# Patient Record
Sex: MALE | Race: AMERICAN INDIAN OR ALASKA NATIVE | NOT HISPANIC OR LATINO | URBAN - METROPOLITAN AREA
[De-identification: names, ages, dates, MRNs, and addresses within clinical notes are randomized per-mention and may not be internally consistent; named-entity substitution may affect disease eponyms.]

---

## 2017-01-05 ENCOUNTER — AMBULATORY - HEALTHEAST (OUTPATIENT)
Dept: NURSING | Facility: CLINIC | Age: 27
End: 2017-01-05

## 2017-01-05 DIAGNOSIS — Z23 NEED FOR IMMUNIZATION AGAINST INFLUENZA: ICD-10-CM

## 2017-02-06 ENCOUNTER — OFFICE VISIT - HEALTHEAST (OUTPATIENT)
Dept: FAMILY MEDICINE | Facility: CLINIC | Age: 27
End: 2017-02-06

## 2017-02-06 DIAGNOSIS — L81.0 POSTINFLAMMATORY HYPERPIGMENTATION: ICD-10-CM

## 2017-02-06 DIAGNOSIS — E66.01 OBESITY, MORBID, BMI 50 OR HIGHER (H): ICD-10-CM

## 2017-02-06 DIAGNOSIS — L03.90 CELLULITIS: ICD-10-CM

## 2017-02-06 DIAGNOSIS — Z00.00 ROUTINE GENERAL MEDICAL EXAMINATION AT A HEALTH CARE FACILITY: ICD-10-CM

## 2017-02-06 ASSESSMENT — MIFFLIN-ST. JEOR: SCORE: 2716.24

## 2017-02-07 ENCOUNTER — COMMUNICATION - HEALTHEAST (OUTPATIENT)
Dept: TELEHEALTH | Facility: CLINIC | Age: 27
End: 2017-02-07

## 2017-02-07 ENCOUNTER — AMBULATORY - HEALTHEAST (OUTPATIENT)
Dept: LAB | Facility: CLINIC | Age: 27
End: 2017-02-07

## 2017-02-07 DIAGNOSIS — R73.09 ELEVATED GLUCOSE: ICD-10-CM

## 2017-02-07 DIAGNOSIS — Z00.00 ROUTINE GENERAL MEDICAL EXAMINATION AT A HEALTH CARE FACILITY: ICD-10-CM

## 2017-02-07 LAB
CHOLEST SERPL-MCNC: 184 MG/DL
FASTING STATUS PATIENT QL REPORTED: YES
HBA1C MFR BLD: 6.7 % (ref 3.5–6.1)
HDLC SERPL-MCNC: 47 MG/DL
LDLC SERPL CALC-MCNC: 98 MG/DL
TRIGL SERPL-MCNC: 193 MG/DL

## 2017-02-09 ENCOUNTER — COMMUNICATION - HEALTHEAST (OUTPATIENT)
Dept: FAMILY MEDICINE | Facility: CLINIC | Age: 27
End: 2017-02-09

## 2017-02-13 ENCOUNTER — OFFICE VISIT - HEALTHEAST (OUTPATIENT)
Dept: FAMILY MEDICINE | Facility: CLINIC | Age: 27
End: 2017-02-13

## 2017-02-13 DIAGNOSIS — E11.9 DIABETES MELLITUS (H): ICD-10-CM

## 2017-02-16 ENCOUNTER — AMBULATORY - HEALTHEAST (OUTPATIENT)
Dept: FAMILY MEDICINE | Facility: CLINIC | Age: 27
End: 2017-02-16

## 2017-02-16 ENCOUNTER — COMMUNICATION - HEALTHEAST (OUTPATIENT)
Dept: FAMILY MEDICINE | Facility: CLINIC | Age: 27
End: 2017-02-16

## 2017-03-21 ENCOUNTER — RECORDS - HEALTHEAST (OUTPATIENT)
Dept: ADMINISTRATIVE | Facility: OTHER | Age: 27
End: 2017-03-21

## 2017-05-17 ENCOUNTER — COMMUNICATION - HEALTHEAST (OUTPATIENT)
Dept: FAMILY MEDICINE | Facility: CLINIC | Age: 27
End: 2017-05-17

## 2017-05-17 DIAGNOSIS — I10 HYPERTENSION: ICD-10-CM

## 2017-10-28 ENCOUNTER — OFFICE VISIT - HEALTHEAST (OUTPATIENT)
Dept: FAMILY MEDICINE | Facility: CLINIC | Age: 27
End: 2017-10-28

## 2017-10-28 DIAGNOSIS — K62.5 RECTAL BLEEDING: ICD-10-CM

## 2017-10-28 DIAGNOSIS — Z23 NEED FOR INFLUENZA VACCINATION: ICD-10-CM

## 2018-03-01 ENCOUNTER — COMMUNICATION - HEALTHEAST (OUTPATIENT)
Dept: FAMILY MEDICINE | Facility: CLINIC | Age: 28
End: 2018-03-01

## 2018-03-01 DIAGNOSIS — I10 HYPERTENSION: ICD-10-CM

## 2018-04-17 ENCOUNTER — COMMUNICATION - HEALTHEAST (OUTPATIENT)
Dept: TELEHEALTH | Facility: CLINIC | Age: 28
End: 2018-04-17

## 2018-04-17 ENCOUNTER — OFFICE VISIT - HEALTHEAST (OUTPATIENT)
Dept: FAMILY MEDICINE | Facility: CLINIC | Age: 28
End: 2018-04-17

## 2018-04-17 ENCOUNTER — COMMUNICATION - HEALTHEAST (OUTPATIENT)
Dept: SCHEDULING | Facility: CLINIC | Age: 28
End: 2018-04-17

## 2018-04-17 DIAGNOSIS — E78.5 HYPERLIPIDEMIA LDL GOAL <70: ICD-10-CM

## 2018-04-17 DIAGNOSIS — E66.01 OBESITY, MORBID, BMI 50 OR HIGHER (H): ICD-10-CM

## 2018-04-17 DIAGNOSIS — E11.9 DIABETES MELLITUS (H): ICD-10-CM

## 2018-04-17 DIAGNOSIS — I10 ESSENTIAL HYPERTENSION: ICD-10-CM

## 2018-04-17 LAB
ANION GAP SERPL CALCULATED.3IONS-SCNC: 20 MMOL/L (ref 5–18)
BUN SERPL-MCNC: 13 MG/DL (ref 8–22)
CALCIUM SERPL-MCNC: 9.9 MG/DL (ref 8.5–10.5)
CHLORIDE BLD-SCNC: 96 MMOL/L (ref 98–107)
CHOLEST SERPL-MCNC: 251 MG/DL
CO2 SERPL-SCNC: 22 MMOL/L (ref 22–31)
CREAT SERPL-MCNC: 1.15 MG/DL (ref 0.7–1.3)
CREAT UR-MCNC: 87.3 MG/DL
FASTING STATUS PATIENT QL REPORTED: YES
GFR SERPL CREATININE-BSD FRML MDRD: >60 ML/MIN/1.73M2
GLUCOSE BLD-MCNC: 471 MG/DL (ref 70–125)
HBA1C MFR BLD: 12.9 % (ref 3.5–6.1)
HDLC SERPL-MCNC: 40 MG/DL
LDLC SERPL CALC-MCNC: 143 MG/DL
MICROALBUMIN UR-MCNC: 43.19 MG/DL (ref 0–1.99)
MICROALBUMIN/CREAT UR: 494.7 MG/G
POTASSIUM BLD-SCNC: 4.5 MMOL/L (ref 3.5–5)
SODIUM SERPL-SCNC: 138 MMOL/L (ref 136–145)
TRIGL SERPL-MCNC: 342 MG/DL

## 2018-04-17 RX ORDER — LISINOPRIL 10 MG/1
10 TABLET ORAL DAILY
Qty: 90 TABLET | Refills: 1 | Status: SHIPPED | OUTPATIENT
Start: 2018-04-17

## 2018-04-17 RX ORDER — ASPIRIN 81 MG/1
81 TABLET, CHEWABLE ORAL DAILY
Qty: 30 TABLET | Refills: 11 | Status: SHIPPED | OUTPATIENT
Start: 2018-04-17

## 2018-04-17 ASSESSMENT — MIFFLIN-ST. JEOR: SCORE: 2545.01

## 2018-04-18 RX ORDER — ATORVASTATIN CALCIUM 80 MG/1
80 TABLET, FILM COATED ORAL AT BEDTIME
Qty: 90 TABLET | Refills: 3 | Status: SHIPPED | OUTPATIENT
Start: 2018-04-18 | End: 2019-04-18

## 2018-04-30 ENCOUNTER — AMBULATORY - HEALTHEAST (OUTPATIENT)
Dept: EDUCATION SERVICES | Facility: CLINIC | Age: 28
End: 2018-04-30

## 2018-05-01 ENCOUNTER — AMBULATORY - HEALTHEAST (OUTPATIENT)
Dept: EDUCATION SERVICES | Facility: CLINIC | Age: 28
End: 2018-05-01

## 2018-05-01 RX ORDER — GLUCOSAMINE HCL/CHONDROITIN SU 500-400 MG
1 CAPSULE ORAL 2 TIMES DAILY
Qty: 100 STRIP | Refills: 3 | Status: SHIPPED | OUTPATIENT
Start: 2018-05-01

## 2018-05-05 ENCOUNTER — COMMUNICATION - HEALTHEAST (OUTPATIENT)
Dept: FAMILY MEDICINE | Facility: CLINIC | Age: 28
End: 2018-05-05

## 2018-05-05 DIAGNOSIS — K64.8 INTERNAL HEMORRHOID: ICD-10-CM

## 2018-05-07 ENCOUNTER — AMBULATORY - HEALTHEAST (OUTPATIENT)
Dept: EDUCATION SERVICES | Facility: CLINIC | Age: 28
End: 2018-05-07

## 2018-05-07 DIAGNOSIS — E11.9 DIABETES (H): ICD-10-CM

## 2018-05-08 ENCOUNTER — AMBULATORY - HEALTHEAST (OUTPATIENT)
Dept: EDUCATION SERVICES | Facility: CLINIC | Age: 28
End: 2018-05-08

## 2018-05-08 ENCOUNTER — COMMUNICATION - HEALTHEAST (OUTPATIENT)
Dept: EDUCATION SERVICES | Facility: CLINIC | Age: 28
End: 2018-05-08

## 2018-05-11 ENCOUNTER — OFFICE VISIT - HEALTHEAST (OUTPATIENT)
Dept: SURGERY | Facility: CLINIC | Age: 28
End: 2018-05-11

## 2018-05-11 DIAGNOSIS — K64.1 GRADE II HEMORRHOIDS: ICD-10-CM

## 2018-05-11 ASSESSMENT — MIFFLIN-ST. JEOR: SCORE: 2532.31

## 2018-05-18 ENCOUNTER — OFFICE VISIT - HEALTHEAST (OUTPATIENT)
Dept: SURGERY | Facility: CLINIC | Age: 28
End: 2018-05-18

## 2018-05-18 DIAGNOSIS — E66.01 OBESITY, MORBID, BMI 50 OR HIGHER (H): ICD-10-CM

## 2018-05-18 DIAGNOSIS — K64.8 INTERNAL BLEEDING HEMORRHOIDS: ICD-10-CM

## 2018-05-18 ASSESSMENT — MIFFLIN-ST. JEOR: SCORE: 2530.49

## 2018-05-21 ENCOUNTER — OFFICE VISIT - HEALTHEAST (OUTPATIENT)
Dept: FAMILY MEDICINE | Facility: CLINIC | Age: 28
End: 2018-05-21

## 2018-05-21 DIAGNOSIS — E11.9 DIABETES MELLITUS (H): ICD-10-CM

## 2018-05-21 DIAGNOSIS — I10 ESSENTIAL HYPERTENSION: ICD-10-CM

## 2018-05-21 DIAGNOSIS — E78.5 HYPERLIPIDEMIA LDL GOAL <70: ICD-10-CM

## 2018-05-21 DIAGNOSIS — E66.01 OBESITY, MORBID, BMI 50 OR HIGHER (H): ICD-10-CM

## 2018-05-21 LAB
ANION GAP SERPL CALCULATED.3IONS-SCNC: 11 MMOL/L (ref 5–18)
BUN SERPL-MCNC: 10 MG/DL (ref 8–22)
CALCIUM SERPL-MCNC: 9.3 MG/DL (ref 8.5–10.5)
CHLORIDE BLD-SCNC: 102 MMOL/L (ref 98–107)
CO2 SERPL-SCNC: 23 MMOL/L (ref 22–31)
CREAT SERPL-MCNC: 0.83 MG/DL (ref 0.7–1.3)
GFR SERPL CREATININE-BSD FRML MDRD: >60 ML/MIN/1.73M2
GLUCOSE BLD-MCNC: 331 MG/DL (ref 70–125)
HBA1C MFR BLD: 13.1 % (ref 3.5–6.1)
POTASSIUM BLD-SCNC: 4.4 MMOL/L (ref 3.5–5)
SODIUM SERPL-SCNC: 136 MMOL/L (ref 136–145)

## 2018-05-22 ENCOUNTER — AMBULATORY - HEALTHEAST (OUTPATIENT)
Dept: EDUCATION SERVICES | Facility: CLINIC | Age: 28
End: 2018-05-22

## 2018-05-23 ENCOUNTER — COMMUNICATION - HEALTHEAST (OUTPATIENT)
Dept: FAMILY MEDICINE | Facility: CLINIC | Age: 28
End: 2018-05-23

## 2018-06-08 ENCOUNTER — OFFICE VISIT - HEALTHEAST (OUTPATIENT)
Dept: SURGERY | Facility: CLINIC | Age: 28
End: 2018-06-08

## 2018-06-08 DIAGNOSIS — K64.8 INTERNAL HEMORRHOIDS: ICD-10-CM

## 2018-06-12 ENCOUNTER — COMMUNICATION - HEALTHEAST (OUTPATIENT)
Dept: EDUCATION SERVICES | Facility: CLINIC | Age: 28
End: 2018-06-12

## 2018-06-19 ENCOUNTER — OFFICE VISIT - HEALTHEAST (OUTPATIENT)
Dept: FAMILY MEDICINE | Facility: CLINIC | Age: 28
End: 2018-06-19

## 2018-06-19 ENCOUNTER — AMBULATORY - HEALTHEAST (OUTPATIENT)
Dept: EDUCATION SERVICES | Facility: CLINIC | Age: 28
End: 2018-06-19

## 2018-06-19 DIAGNOSIS — E66.01 OBESITY, MORBID, BMI 50 OR HIGHER (H): ICD-10-CM

## 2018-06-19 DIAGNOSIS — E11.9 DIABETES MELLITUS (H): ICD-10-CM

## 2018-06-19 DIAGNOSIS — I10 ESSENTIAL HYPERTENSION: ICD-10-CM

## 2018-06-19 DIAGNOSIS — Z23 NEED FOR TDAP VACCINATION: ICD-10-CM

## 2018-06-19 DIAGNOSIS — E78.5 HYPERLIPIDEMIA LDL GOAL <70: ICD-10-CM

## 2018-06-19 LAB — HBA1C MFR BLD: 10.5 % (ref 3.5–6.1)

## 2018-06-22 ENCOUNTER — OFFICE VISIT - HEALTHEAST (OUTPATIENT)
Dept: SURGERY | Facility: CLINIC | Age: 28
End: 2018-06-22

## 2018-06-22 DIAGNOSIS — K64.8 INTERNAL HEMORRHOIDS: ICD-10-CM

## 2018-06-22 DIAGNOSIS — E66.01 MORBID OBESITY (H): ICD-10-CM

## 2020-05-29 ENCOUNTER — COMMUNICATION - HEALTHEAST (OUTPATIENT)
Dept: FAMILY MEDICINE | Facility: CLINIC | Age: 30
End: 2020-05-29

## 2020-06-02 ENCOUNTER — COMMUNICATION - HEALTHEAST (OUTPATIENT)
Dept: FAMILY MEDICINE | Facility: CLINIC | Age: 30
End: 2020-06-02

## 2021-05-30 VITALS — BODY MASS INDEX: 55.07 KG/M2 | WEIGHT: 315 LBS

## 2021-05-30 VITALS — BODY MASS INDEX: 45.1 KG/M2 | HEIGHT: 70 IN | WEIGHT: 315 LBS

## 2021-05-31 VITALS — WEIGHT: 315 LBS | BODY MASS INDEX: 55.04 KG/M2

## 2021-06-01 VITALS — WEIGHT: 315 LBS | BODY MASS INDEX: 48.69 KG/M2

## 2021-06-01 VITALS — WEIGHT: 315 LBS | BODY MASS INDEX: 45.1 KG/M2 | HEIGHT: 70 IN

## 2021-06-01 VITALS — BODY MASS INDEX: 48.11 KG/M2 | WEIGHT: 315 LBS

## 2021-06-01 VITALS — BODY MASS INDEX: 44.1 KG/M2 | WEIGHT: 315 LBS | HEIGHT: 71 IN

## 2021-06-01 VITALS — BODY MASS INDEX: 48.32 KG/M2 | WEIGHT: 315 LBS

## 2021-06-01 VITALS — HEIGHT: 71 IN | WEIGHT: 315 LBS | BODY MASS INDEX: 44.1 KG/M2

## 2021-06-01 VITALS — BODY MASS INDEX: 49.41 KG/M2 | WEIGHT: 315 LBS

## 2021-06-08 NOTE — PROGRESS NOTES
ASSESSMENT & PLAN:  1. Diabetes mellitus  - Glutamic Acid Decarboxylase  - C-Peptide  - Basic Metabolic Panel; Future  - Microalbumin, Random Urine  - Ambulatory referral to Ophthalmology  - Ambulatory referral to Diabetes Education (New Diagnosis)  - Basic Metabolic Panel     Informed of test results.  Newly diagnosed diabetes.  Fairly young at time of diagnosis.  We'll rule out type 1 diabetes.  Discussed condition in general as well as complications associated with it.  Advised dietary modification, continue to increase physical activity as tolerated.  Await results prior to further recommendations.  We also discussed ACE inhibitor if urine microalbumin comes back positive.  Yearly eye exam and we'll refer him to [facility].  Check his feet regularly and avoid walking barefoot.  Follow up in 3 months.  Also refer him to diabetes educators.  Cellulitis improved.  To finish the full course of antibiotic treatment.  He was agreeable with the plans.    Orders Placed This Encounter   Procedures     Glutamic Acid Decarboxylase     C-Peptide     Basic Metabolic Panel     Standing Status:   Future     Number of Occurrences:   1     Standing Expiration Date:   2/13/2018     Microalbumin, Random Urine     Ambulatory referral to Ophthalmology     Referral Priority:   Routine     Referral Type:   Consultation     Referral Reason:   Evaluation and Treatment     Referral Location:   [facility]     Requested Specialty:   Ophthalmology     Number of Visits Requested:   1     Ambulatory referral to Diabetes Education (New Diagnosis)     Referral Priority:   Routine     Referral Type:   Consultation     Referral Reason:   Evaluation and Treatment     Number of Visits Requested:   1      CHIEF COMPLAINT:  Chief Complaint   Patient presents with     Lab Result Follow Up        HISTORY OF PRESENT ILLNESS:  Joselo is a 26 y.o. male presenting to the clinic today to discuss his newly diagnosed diabetes. His A1c was 6.7  and his fasting glucose was 164 on 2/7/2017. His total cholesterol was 184, triglycerides were 193, HDL was 47, and LDL was 98 on 2/7/2017. He does consume a lot of flavored beverages; he has soda at restaurants about 3 times per week, but he is trying to just drink water from now on. He does not eat many sweets or treats, but he does eat many carbohydrates like pasta on a daily basis. He went to the gym 5 times last week. He typically alternates days between treadmill, elliptical and stairs for cardio exercise. He alternates between arm and leg weight exercises on different days; he does 3 sets of 12 repetitions with weights. He is wondering if he needs to start diabetes medication at this time, and he is wondering if he has Type 1 or Type 2. He would like an opthalmology referral.     REVIEW OF SYSTEMS:   Cellulitis: He is taking Keflex 500 mg as prescribed. He denies any pain, redness, or warmth of the area. He and his parents both think the cyst has decreased in size.     He denies any fever, chills, cough, URI's, hematuria, hemoptysis, hematochezia, dizziness, LOC, chest pain, heart palpitations, SOB, abdominal pain, pelvic pain, dysuria, tremors, shaking, nausea, emesis, localized numbness or weakness. All other systems are negative.     PFSH:    TOBACCO USE:  History   Smoking Status     Never Smoker   Smokeless Tobacco     Never Used        VITALS:  Vitals:    02/13/17 1223   BP: 130/80   Patient Site: Left Arm   Patient Position: Sitting   Cuff Size: Adult Large   Pulse: 100   SpO2: 96%   Weight: (!) 383 lb 12.8 oz (174.1 kg)     Wt Readings from Last 3 Encounters:   02/13/17 (!) 383 lb 12.8 oz (174.1 kg)   02/06/17 (!) 384 lb 11.2 oz (174.5 kg)   11/12/15 (!) 374 lb 6.4 oz (169.8 kg)     Body mass index is 55.07 kg/(m^2).     PHYSICAL EXAM:  Visit Vitals     /80 (Patient Site: Left Arm, Patient Position: Sitting, Cuff Size: Adult Large)     Pulse 100     Wt (!) 383 lb 12.8 oz (174.1 kg)     SpO2 96%      BMI 55.07 kg/m2       Vital signs noted above. AAO ×3.  HEENT no nasal discharge, moist oral mucosa.  Neck: Supple neck, nonpalpable cervical lymph nodes. Lungs: Clear to auscultation bilateral.  Heart: S1-S2 regular rate and rhythm, no murmurs were noted.  Abdomen: Flabby, soft with bowel sounds and nontender.  Lesion on his right lower abdomen is smaller to barely noticeable since after the last visit.  Nontender to palpation.  Normoactive discharge, bleeding.  Extremities: No edema, pulses were full and equal.  Monofilament testing was good.  No lesions noted.    QUALITY MEASURES:  The following high BMI interventions were performed this visit: encouragement to exercise, dietary needs education, exercise promotion: strength training, special diet education, dietary management education, guidance, and counseling and lifestyle education regarding diet    DATA REVIEWED:  ADDITIONAL HISTORY SUMMARIZED (2): None.   DECISION TO OBTAIN EXTRA INFORMATION (1): None.   RADIOLOGY TESTS (1): None.  LABS (1): Reviewed and ordered labs.  MEDICINE TESTS (1): None.  INDEPENDENT REVIEW (2 each): None.     The visit lasted a total of 18 minutes face to face with the patient. Over 50% of the time was spent counseling and educating the patient about diabetes, diet, exercise, and coordination of care.     INatalia, am scribing for and in the presence of Dr. Hyde.  I, Dr. Hyde, personally performed the services described in this documentation, as scribed by Natalia Angulo in my presence, and it is both accurate and complete.     MEDICATIONS:  Current Outpatient Prescriptions   Medication Sig Dispense Refill     cephalexin (KEFLEX) 500 MG capsule Take 1 capsule (500 mg total) by mouth 3 (three) times a day for 7 days. 21 capsule 0     No current facility-administered medications for this visit.         Total data points: 1

## 2021-06-08 NOTE — TELEPHONE ENCOUNTER
"Spoke with patient in regards to f/u on meds and BP. Stated he is no longer seen at this clinic - goes elsewhere that is more affordable to him. Graciela no longer PCP.     Because Graciela is still \"assigned PCP\"- pt's HTN will continue to flag her.   "

## 2021-06-08 NOTE — PROGRESS NOTES
ASSESSMENT & PLAN:  1. Routine general medical examination at a health care facility  - Lipid Cascade; Future  - Glucose; Future  - HM2(CBC w/o Differential); Future    2. Cellulitis    3. Obesity, morbid, BMI 50 or higher    4. Lump    5. Postinflammatory hyperpigmentation     He is not fasting.  With his risk factors, we will do labs and sent for future orders.  Encouraged to improve food choices, less of carbs.  Increase physical activity as tolerated and consistent with exercise.  For the lesion in his upper back, more of lipoma. Lesion in his left chest due to postinflammatory hyperpigmentation. Note for changes with time.  For the right lower abdominal lesion, will treat with Keflex.  Does not recall his reaction to penicillin but not anaphylactic reaction.  Recheck in a week if persistent or worse.  Encourage annual physical.  He was agreeable with the plans.    Orders Placed This Encounter   Procedures     Lipid Cascade     Standing Status:   Future     Standing Expiration Date:   2/6/2018     Order Specific Question:   Fasting is required?     Answer:   Yes     Glucose     Standing Status:   Future     Standing Expiration Date:   2/6/2018     HM2(CBC w/o Differential)     Standing Status:   Future     Standing Expiration Date:   2/6/2018        CHIEF COMPLAINT:  Chief Complaint   Patient presents with     Annual Exam     Pt is NOT FASTING today.         HISTORY OF PRESENT ILLNESS:  Joselo is a 26 y.o. male presenting to the clinic today for an annual exam and for evaluation of multiple skin lesions.     Multiple Cysts: He has had a lump on his right upper back for about 8 months now; he noticed it incidentally. He does not think it has been increasing in size or draining pus or blood. It is not painful. He has noticed a lesion on his right lower abdomen for about 1 month now. He denies any known changes in size or shape. He denies any bleeding but there is some clear drainage; he last noticed drainage 1-2  weeks ago after he pressed on it. He has had a lesion around his left nipple since the middle of the last year, as well.  No bleeding, pain, discharge.    Health Maintenance: He is not fasting today. He denies any smoking or alcohol consumption. He had been exercising regularly until mid-December 2016 when he traveled home to [place] for a month or so. He had been going to the gym 3-5 times per week and he now has a gym membership again for the next 6 months. He does either 30 minutes cardio or 1 hour weight-training each session. He does not eat healthy; he likes to eat pasta and meat, but he limits his sweets. He is aware of his family history of diabetes which is why he has been trying to exercise more.      REVIEW OF SYSTEMS:   Antibiotic Allergies: He had allergy testing done when he was a baby which revealed allergies to penicillin and amoxicillin. He does not remember any shortness of breath or tongue swelling.     He denies any new diagnoses medical conditions, hospitalizations, surgeries, or ED visits since his last office visit in November 2015. He denies any fever, chills, cough, URI's, hematuria, hemoptysis, hematochezia, dizziness, LOC, chest pain, heart palpitations, SOB, abdominal pain, pelvic pain, dysuria, or changes in stool caliber. All other systems are negative.     PFSH:  His father has basal cell carcinoma on his nose. His paternal grandmother was in her 80's when she was diagnosed with cancer; she did not want chemotherapy or radiation and so was treated with multiple blood transfusions. His maternal grandmother, maternal aunts and uncles, father and paternal grandfather have diabetes.     History   Smoking Status     Never Smoker   Smokeless Tobacco     Never Used        Family History   Problem Relation Age of Onset     Stroke Paternal Grandmother      Cancer Paternal Grandmother 80     Heart attack Paternal Grandfather      Diabetes Paternal Grandfather      Diabetes Maternal Aunt       "Diabetes Maternal Uncle      Diabetes Maternal Grandmother      Hyperlipidemia Mother      Diabetes Mother      Hypothyroidism Mother      Diabetes Father      Basal cell carcinoma Father         Social History     Social History     Marital status: Single     Spouse name: N/A     Number of children: N/A     Years of education: N/A     Occupational History     Not on file.     Social History Main Topics     Smoking status: Never Smoker     Smokeless tobacco: Never Used     Alcohol use Not on file     Drug use: Not on file     Sexual activity: Not on file     Other Topics Concern     Not on file     Social History Narrative        Past Surgical History:   Procedure Laterality Date     NO PAST SURGERIES          Allergies   Allergen Reactions     Penicillins Unknown     Amoxicillin Unknown        Active Ambulatory Problems     Diagnosis Date Noted     Obesity, morbid, BMI 50 or higher 02/06/2017     Resolved Ambulatory Problems     Diagnosis Date Noted     No Resolved Ambulatory Problems     No Additional Past Medical History        VITALS:  Vitals:    02/06/17 1611   BP: 130/80   Patient Site: Right Arm   Patient Position: Sitting   Cuff Size: Adult Large   Pulse: 97   SpO2: 98%   Weight: (!) 384 lb 11.2 oz (174.5 kg)   Height: 5' 10\" (1.778 m)     Wt Readings from Last 3 Encounters:   02/06/17 (!) 384 lb 11.2 oz (174.5 kg)   11/12/15 (!) 374 lb 6.4 oz (169.8 kg)   10/09/15 (!) 372 lb (168.7 kg)     Body mass index is 55.2 kg/(m^2).     PHYSICAL EXAM:  Visit Vitals     /80 (Patient Site: Right Arm, Patient Position: Sitting, Cuff Size: Adult Large)     Pulse 97     Ht 5' 10\" (1.778 m)     Wt (!) 384 lb 11.2 oz (174.5 kg)     SpO2 98%     BMI 55.2 kg/m2       Vital signs noted above. AAO ×3.  HEENT negative.  Neck: Supple neck, nonpalpable cervical lymph nodes. No thyromegaly. Lungs: Clear to auscultation bilateral.  Heart: S1-S2 regular rate and rhythm, no murmurs were noted.  Abdomen: Flabby, soft with bowel " sounds and nontender.  Extremities: No edema, pulses were full and equal. Genital exam: No hernia noted, testes descended bilaterally.  Skin: Lump on his right upper back, 2 x 1 cm, nontender.  No skin discoloration.  Hyperpigmented patch in his left chest, inframammary near sternal region.  No active bleeding, discharge.  Slightly elevated.  2 x 2 centimeter.  Center was slightly clear.  Hyperpigmentation on his right lower abdomen with area of erythema in the center.  No active discharge, bleeding.  Nontender.  Base was firm.  Base at 3 x 2 cm.    QUALITY MEASURES:  The following high BMI interventions were performed this visit: encouragement to exercise, dietary needs education and lifestyle education regarding diet     DATA REVIEWED:  ADDITIONAL HISTORY SUMMARIZED (2): Reviewed last office visit 11/12/2015.  DECISION TO OBTAIN EXTRA INFORMATION (1): None.   RADIOLOGY TESTS (1): None.  LABS (1): Reviewed and ordered labs.  MEDICINE TESTS (1): None.  INDEPENDENT REVIEW (2 each): None.      The visit lasted a total of 15 minutes face to face with the patient. Over 50% of the time was spent counseling and educating the patient about his family history, skin lesions, diet, exercise, and health maintenance.     Natalia WOODS, am scribing for and in the presence of Dr. Hyde.  IDr. Hyde, personally performed the services described in this documentation, as scribed by Natalia Angulo in my presence, and it is both accurate and complete.     MEDICATIONS:  Current Outpatient Prescriptions   Medication Sig Dispense Refill     cephalexin (KEFLEX) 500 MG capsule Take 1 capsule (500 mg total) by mouth 3 (three) times a day for 7 days. 21 capsule 0     No current facility-administered medications for this visit.         Total data points: 3

## 2021-06-13 NOTE — PROGRESS NOTES
ASSESSMENT/PLAN:  Pleasant 27-year-old gentleman with recurrent rectal bleeding with the most recent episode occurring yesterday.  His rectal examination was somewhat limited due to his body habitus; however, I did appreciate bright red bleeding upon examination with the anoscope.  I suspect bleeding internal hemorrhoid.  I will asked him to see colorectal surgery.  I counseled him on fluid, dietary fiber, and continuing to make sure his bowels are moving regularly without straining or significant effort.  He was agreeable to the plan  He is a new diabetic and has not had a follow-up with his primary care provider.  Advised him to schedule an appointment to see his primary care provider for a diabetes follow-up.  A flu vaccine will be given to him today.  He verbalized understanding and agreement with our plan today    Rectal bleeding  -     Ambulatory referral to Colorectal Surgery    SUBJECTIVE:    Joselo Eastonena  is a 27 y.o. male who comes in today for rectal bleeding that started yesterday.  He was in his normal state of health when he noticed painless bright red blood per rectum in the toilet bowel yesterday for his regular bowel movement.  He also noted bright red blood on the tissue paper upon wiping following completion of his bowel movement.  This occurred yesterday morning.  He had another episode of bowel movement last night and again this morning which showed right red blood on the tissue paper following bowel movement.  Again these 2 episodes were painless.  These last 2 episodes did not demonstrate any blood in the toilet bowl.  The patient had a previous episode 2 months ago that lasted about 2-3 days.  His normal bowel movements is 2 times per day and he continues to have this pattern.  Denies constipation.  Denies rectal pain, abdominal pain, nausea, vomiting.  No change in diet.  No trauma to the abdomen, back, perineal, rectal area.  No family history of colon problems.    He is a new  diabetic diagnosis earlier this year.    Lab Results   Component Value Date    HGBA1C 6.7 (H) 02/07/2017     He is not on medication for diabetes.  He does have microalbuminuria for which lisinopril was prescribed.  He has not had any follow-up since then.      Review of Systems (except those mentioned above)  Constitutional: Negative.   HENT: Negative.   Eyes: Negative.   Respiratory: Negative.   Cardiovascular: Negative.   Gastrointestinal: Negative.   Endocrine: Negative.   Genitourinary: Negative.   Musculoskeletal: Negative.   Skin: Negative.   Allergic/Immunologic: Negative.   Neurological: Negative.   Hematological: Negative.   Psychiatric/Behavioral: Negative.     Patient Active Problem List    Diagnosis Date Noted     Diabetes mellitus 02/13/2017     Obesity, morbid, BMI 50 or higher 02/06/2017     Allergies   Allergen Reactions     Penicillins Unknown     Amoxicillin Unknown     Current Outpatient Prescriptions   Medication Sig Dispense Refill     lisinopril (PRINIVIL,ZESTRIL) 5 MG tablet Take 1 tablet (5 mg total) by mouth daily. 90 tablet 2     No current facility-administered medications for this visit.      No past medical history on file.  Past Surgical History:   Procedure Laterality Date     NO PAST SURGERIES       Social History     Social History     Marital status: Single     Spouse name: N/A     Number of children: N/A     Years of education: N/A     Social History Main Topics     Smoking status: Never Smoker     Smokeless tobacco: Never Used     Alcohol use None     Drug use: None     Sexual activity: Not Asked     Other Topics Concern     None     Social History Narrative     Family History   Problem Relation Age of Onset     Stroke Paternal Grandmother      Cancer Paternal Grandmother 80     Heart attack Paternal Grandfather      Diabetes Paternal Grandfather      Diabetes Maternal Aunt      Diabetes Maternal Uncle      Diabetes Maternal Grandmother      Hyperlipidemia Mother      Diabetes  Mother      Hypothyroidism Mother      Diabetes Father      Basal cell carcinoma Father          OBJECTIVE:    Vitals:    10/28/17 1053   BP: 121/68   Pulse: 87   Resp: 14   Temp: 98.3  F (36.8  C)   TempSrc: Oral   SpO2: 95%   Weight: (!) 383 lb 9.6 oz (174 kg)     Body mass index is 55.04 kg/(m^2).    Physical Exam:  Constitutional: Patient is oriented to person, place, and time. Patient appears well-developed and well-nourished. No distress.   Head: Normocephalic and atraumatic.   Right Ear: External ear normal.   Left Ear: External ear normal.   Nose: Nose normal.   Eyes: Conjunctivae and EOM are normal. Right eye exhibits no discharge. Left eye exhibits no discharge. No scleral icterus.   Abdominal: Soft. Bowel sounds are normal. Patient exhibits no distension and no mass. There is no tenderness. There is no rebound and no guarding.   Skin: Skin is warm and dry. No rash noted. Patient is not diaphoretic. No erythema. No pallor.   Rectal exam:  Stool at the anal opening without mass, fissure, or deformity.  Digital rectal exam revealed good anal sphincter tone without mass.  There was stool on the examining finger.  Anoscopy somewhat limited due to his size.  Upon insertion of the endoscopy, there was expression of bright red blood.  Limited anoscopy exam did not reveal erythema, mass, fissure.  There is stool residual along the distal anal canal    Results for orders placed or performed in visit on 02/13/17   Glutamic Acid Decarboxylase   Result Value Ref Range    GAD65 Antibody Assay 0.00 <=0.02 nmol/L   C-Peptide   Result Value Ref Range    C-Peptide 7.3 (H) 1.1 - 4.4 ng/mL   Microalbumin, Random Urine   Result Value Ref Range    Microalbumin, Random Urine 3.68 (H) 0.00 - 1.99 mg/dL    Creatinine, Urine 97.0 mg/dL    Microalbumin/Creatinine Ratio Random Urine 37.9 (H) <=19.9 mg/g   Basic Metabolic Panel   Result Value Ref Range    Sodium 141 136 - 145 mmol/L    Potassium 4.9 3.5 - 5.0 mmol/L    Chloride 105  98 - 107 mmol/L    CO2 25 22 - 31 mmol/L    Anion Gap, Calculation 11 5 - 18 mmol/L    Glucose 118 70 - 125 mg/dL    Calcium 9.5 8.5 - 10.5 mg/dL    BUN 11 8 - 22 mg/dL    Creatinine 0.77 0.70 - 1.30 mg/dL    GFR MDRD Af Amer >60 >60 mL/min/1.73m2    GFR MDRD Non Af Amer >60 >60 mL/min/1.73m2

## 2021-06-15 PROBLEM — E11.9 DIABETES MELLITUS (H): Status: ACTIVE | Noted: 2017-02-13

## 2021-06-15 PROBLEM — E66.01 OBESITY, MORBID, BMI 50 OR HIGHER (H): Status: ACTIVE | Noted: 2017-02-06

## 2021-06-16 PROBLEM — I10 ESSENTIAL HYPERTENSION: Status: ACTIVE | Noted: 2018-04-18

## 2021-06-16 PROBLEM — E78.5 HYPERLIPIDEMIA LDL GOAL <70: Status: ACTIVE | Noted: 2018-04-18

## 2021-06-17 NOTE — PROGRESS NOTES
GENERAL SURGICAL CONSULTATION    I was requested by Brenna Hyde MD to consult on this pt to evaluate them for hemorrhoids.    HPI:  This is a 27 y.o. male here today with complaints of intermittent rectal bleeding.  He is evaluated in urgency care and the suspected that he had internal hemorrhoids.  Patient does not have any perirectal pain.  He does describe troubles with constipation from time to time.  At the risk factors for hemorrhoidal disease include that this gentleman is been doing a fair amount of weight lifting.  And he is morbidly obese.    Allergies:Penicillins and Amoxicillin    No past medical history on file.    Past Surgical History:   Procedure Laterality Date     NO PAST SURGERIES         CURRENT MEDS:  Current Outpatient Prescriptions   Medication Sig Dispense Refill     aspirin 81 mg chewable tablet Chew 1 tablet (81 mg total) daily. 30 tablet 11     atorvastatin (LIPITOR) 80 MG tablet Take 1 tablet (80 mg total) by mouth at bedtime. 90 tablet 3     blood glucose test (CONTOUR NEXT TEST STRIPS) strips Use 1 each As Directed 2 (two) times a day. Dispense brand per patient's insurance at pharmacy discretion. 100 strip 3     generic lancets (FINGERSTIX LANCETS) Dispense brand per patient's insurance at pharmacy discretion. 100 each 4     lisinopril (PRINIVIL,ZESTRIL) 10 MG tablet Take 1 tablet (10 mg total) by mouth daily. 90 tablet 1     MULTIVIT-MINERALS/FOLIC ACID (MEN'S MULTIVITAMIN GUMMIES ORAL) Take by mouth.       sitaGLIPtin-metFORMIN (JANUMET)  mg per tablet Take 1 tablet by mouth 2 (two) times a day with meals. 180 tablet 0     No current facility-administered medications for this visit.        Family History   Problem Relation Age of Onset     Stroke Paternal Grandmother      Cancer Paternal Grandmother 80     Heart attack Paternal Grandfather      Diabetes Paternal Grandfather      Diabetes Maternal Aunt      Diabetes Maternal Uncle      Diabetes Maternal Grandmother       "Hyperlipidemia Mother      Diabetes Mother      Hypothyroidism Mother      Diabetes Father      Basal cell carcinoma Father      Family history is not pertinent to this patients Chief Complaint.     reports that he has never smoked. He has never used smokeless tobacco. He reports that he does not drink alcohol or use illicit drugs.    Review of Systems -   10 point Review of systems is negative except for; as mentioned above in HPI and PMHx    /78 (Patient Site: Right Arm, Patient Position: Sitting, Cuff Size: Adult Large)  Pulse (!) 102  Ht 5' 10.5\" (1.791 m)  Wt (!) 342 lb 6.4 oz (155.3 kg)  SpO2 94%  BMI 48.43 kg/m2  Body mass index is 48.43 kg/(m^2).    EXAM:  GENERAL: Morbidly obese  HEENT: EOMI, Anicteric Sclera, Moist Mucous Membranes,  In Mouth the pt does not have redness or bleeding gums  CARDIOVASCULAR: RRR w/out murmur   CHEST/LUNG: Clear to Auscultation  ABDOMEN:  Non tender to palpation, +BS  MUSCULOSKELETAL:  No deformities with good range of motion in all extremities  NEURO: He is ambulatory with good strength in both legs.  HEME/LYMPH: No Cervical Adenopathy or tenderness.   Rectal exam: No external hemorrhoidal disease, on anoscopy there is one column of internal hemorrhoids that are hypertrophied    IMAGES:  I evaluated these images myself and none    Assessment/Plan:  Internal hemorrhoids likely source of the lower GI bleeding.  I suspect the hemorrhoids are result of the patient's obesity and his constipation along with the weight lifting as a potential contributor.  Given the single column of hemorrhoidal tissue that is affected I think this patient will benefit from having a banding technique used to treat the internal hemorrhoids.  After the internal hemorrhoids are banded if he continues to have lower GI bleeding then I would recommend a colonoscopy.    Hemorrhoid Banding Procedure:  Patient was placed in a jackknife position  The anoscope with loop was advanced into the rectum.  " The obturator was removed.  The scope was slowly withdrawn and a prominent hemorrhoid grouping was noted through the lumen of the anoscope.  The hemorrhoid  was advanced into the anoscope with suction the prominent hemorrhoid tissue was aspirated into the lumen of the banding apparatus.  2 bands were then fired over the hemorrhoid at the 6:00:: position.  The suction was released and the  was withdrawn.  The anoscope was then withdrawn.      Alen Horner MD  [facility]  [phone]

## 2021-06-17 NOTE — PROGRESS NOTES
Hemorrhoids education & surgery packet provided to pt.    Nyla Angulo CMA ([facility])     Ph: [phone]    Fx: [phone]

## 2021-06-17 NOTE — PROGRESS NOTES
Assessment:   Joselo Mata Jr. is a 27 y.o. male seen for routine physical with  Diabetes (Sx, dry mouth/skin, frequent urination, both parents have hx of ) and Annual Exam (pt is fasting )      Joselo was seen today for diabetes and annual exam.    Diabetes mellitus  -     Hemoglobin A1c  -     Lipid panel  -     Basic metabolic panel  -     Diabetes foot exam  -     aspirin 81 mg chewable tablet; Chew 1 tablet (81 mg total) daily.  -     Ambulatory referral to Diabetes Education Program(New Diagnosis)  -     Ambulatory referral to Optometry  -     Microalbumin, Random Urine    Obesity, morbid, BMI 50 or higher    Essential hypertension  -     lisinopril (PRINIVIL,ZESTRIL) 10 MG tablet; Take 1 tablet (10 mg total) by mouth daily.    Other orders  -     sitaGLIPtin-metFORMIN (JANUMET)  mg per tablet; Take 1 tablet by mouth 2 (two) times a day with meals.      Patient will plan to Follow up in 2 wk   Plan:      Education reviewed: depression evaluation, low fat, low cholesterol diet, skin cancer screening and weight bearing exercise.    Plan per .medications  and orders     Subjective:       Joselo Mata Jr. is a 27 y.o.who presents for an annual exam.  New problem discussed today :     Diabetes Mellitus Type II, Initial Visit:  Last yr patient was told he has diabetes mellitus but no treatment or follow up started Patient here for an initial evaluation of Type 2 diabetes mellitus.  Current symptoms/problems include polydipsia and polyuria and have been worsening. Symptoms have been present for several years.    The patient was initially diagnosed with Type 2 diabetes mellitus based on the following criteria:    Lab Results   Component Value Date    HGBA1C 12.9 (H) 04/17/2018   .    Known diabetic complications: none  Cardiovascular risk factors: diabetes mellitus, dyslipidemia, family history of premature cardiovascular disease, hypertension, male gender, microalbuminuria, obesity (BMI >= 30  "kg/m2) and sedentary lifestyle  Current diabetic medications include none.     Eye exam current (within one year): no  Weight trend: stable little loss in wt compare to last yr  Prior visit with dietician: no  Current diet: in general, an \"unhealthy\" diet, on average, 3 meals per day, on average, 3 fast food meals per week  Current exercise: cardiovascular workout on exercise equipment and walking    Current monitoring regimen: none    Is He on ACE inhibitor or angiotensin II receptor blocker?   Yes   lisinopril (generic)        Healthy Habits:   Regular Exercise: Yes  Sunscreen Use: Yes  Healthy Diet: trying most of the time   Dental Visits Regularly: Yes  Seat Belt: Yes  Sexually active: No  Hemoccults: N/A  Colonoscopy: N/A  Lipid Profile: Yes  Glucose Screen: Yes  Prevention of Osteoporosis: N/A  Last Dexa: N/A  Guns at Home:  No         Current Outpatient Prescriptions   Medication Sig Dispense Refill     lisinopril (PRINIVIL,ZESTRIL) 10 MG tablet Take 1 tablet (10 mg total) by mouth daily. 90 tablet 1     MULTIVIT-MINERALS/FOLIC ACID (MEN'S MULTIVITAMIN GUMMIES ORAL) Take by mouth.       aspirin 81 mg chewable tablet Chew 1 tablet (81 mg total) daily. 30 tablet 11     sitaGLIPtin-metFORMIN (JANUMET)  mg per tablet Take 1 tablet by mouth 2 (two) times a day with meals. 180 tablet 0     No current facility-administered medications for this visit.      No past medical history on file.  Past Surgical History:   Procedure Laterality Date     NO PAST SURGERIES       Penicillins and Amoxicillin  Family History   Problem Relation Age of Onset     Stroke Paternal Grandmother      Cancer Paternal Grandmother 80     Heart attack Paternal Grandfather      Diabetes Paternal Grandfather      Diabetes Maternal Aunt      Diabetes Maternal Uncle      Diabetes Maternal Grandmother      Hyperlipidemia Mother      Diabetes Mother      Hypothyroidism Mother      Diabetes Father      Basal cell carcinoma Father      Social " "History     Social History     Marital status: Single     Spouse name: N/A     Number of children: N/A     Years of education: N/A     Occupational History     Not on file.     Social History Main Topics     Smoking status: Never Smoker     Smokeless tobacco: Never Used     Alcohol use Not on file     Drug use: Not on file     Sexual activity: Not on file     Other Topics Concern     Not on file     Social History Narrative     Social History     Social History Narrative       Review of Systems  General:  Denies problem  Eyes: Denies problem  Ears/Nose/Throat: Denies problem  Cardiovascular: Denies problem  Respiratory:  Denies problem  Gastrointestinal:  Denies problem, Genitourinary: Denies problem  Musculoskeletal:  Denies problem  Skin: Denies problem  Neurologic: Denies problem  Psychiatric: Denies problem  Endocrine: Denies problem  Heme/Lymphatic: Denies problem   Allergic/Immunologic: Denies problem         Objective:         Vitals:    04/17/18 0859   BP: (!) 126/98   Pulse: 96   Weight: (!) 345 lb 4.8 oz (156.6 kg)   Height: 5' 10.47\" (1.79 m)       Physical Exam:  General Appearance: Alert, cooperative, no distress, appears stated age  Head: Normocephalic, without obvious abnormality, atraumatic  Eyes: PERRL, conjunctiva/corneas clear, EOM's intact  Ears: Normal TM's and external ear canals, both ears  Nose: Nares normal, septum midline,mucosa normal, no drainage  Throat: Lips, mucosa, and tongue normal; teeth and gums normal  Neck: Supple, symmetrical, trachea midline, no adenopathy;  thyroid: not enlarged, symmetric, no tenderness/mass/nodules; no carotid bruit or JVD  Back: Symmetric, no curvature, ROM normal, no CVA tenderness  Lungs: Clear to auscultation bilaterally, respirations unlabored  Heart: Regular rate and rhythm, S1 and S2 normal, no murmur, rub, or gallop, Abdomen: Soft, non-tender, bowel sounds active all four quadrants,  no masses, no organomegaly  Extremities: Extremities normal, " atraumatic, no cyanosis or edema  Skin: Skin color, texture, turgor normal, no rashes or lesions  Lymph nodes: Cervical, supraclavicular, and axillary nodes normal  Neurologic: Normal , Mood affect normal     Results for orders placed or performed in visit on 04/17/18   Hemoglobin A1c   Result Value Ref Range    Hemoglobin A1c 12.9 (H) 3.5 - 6.1 %            I spent 40 minutes with the patient, >50% of which was in counseling regarding the patient's medical issues as noted above.  Mindy Owens     Patient Instructions     Dear Joselo,    GOAL :of the treatment  Is to control diabetes --  diabetes is a chronic and progressive disease, which, as a result, may  requires insulin at some point; emphasizing that needing insulin is not a failure.   -- Main goal of controlling diabetes is to prevent vascular complications (retinopathy, nephropathy, neuropathy and cardiovascular) or the progression of these.   -- Encouraged  to check feet daily for abnormalities.     Our HgA1c goal is less <7 for optimal diabetic control , your diabetes had gotten quite eorse over time   I recommend seeing a diabetic educator asap and start medication Janumet 1 tab oral twice daily  For now , we might have to add other medications or insulin at bedtime if no change in diabetes at Follow up visit.  We increase your blood pressure medication to 10 mg daily     Lab Results   Component Value Date    HGBA1C 12.9 (H) 04/17/2018    HGBA1C 6.7 (H) 02/07/2017     Lab Results   Component Value Date    MICROALBUR 3.68 (H) 02/13/2017    LDLCALC 98 02/07/2017    CREATININE 0.77 02/13/2017           -- Continue  cholesterol lowering drug  per guidelines to prevent heart disease     -- Measure blood sugar 1 time per day at varying times pre-prandial, 2hrs post prandial and at bedtime. Bring log book to next appointment.   GOAL <100 fasting and <140-160 postmeal    1. Reduced intakes of saturated fats and cholesterol.   2. Take ample dietary fiber or  supplement.   3. Weight control optimal BMI <25 but even 5% of weight loss from your current weight will help all cardiac risk factors.   4Increased physical activity at least 150 minute/wk where you heart rate>120 and you sweat and very difficult to talk.  5-- please comes to clinic in future fasting, may still take medications with water while     fasting (especially blood pressure medication).   6- Recommenend ophthalmology exam (frequency set per ophthalmology).   7-- Recommend dental exam every 6 months.     8- Return to care in 3 months for next diabetes check.     Mindy Owens

## 2021-06-18 NOTE — PROGRESS NOTES
Hemorrhoid Banding Procedure:  Patient was placed in a jackknife position  The anoscope with loop was advanced into the rectum.  The obturator was removed.  The scope was slowly withdrawn and a prominent hemorrhoid grouping was noted through the lumen of the anoscope.  The hemorrhoid  was advanced into the anoscope with suction the prominent hemorrhoid tissue was aspirated into the lumen of the banding apparatus.  2 bands were then fired over the hemorrhoid at the 3:00 position.  The suction was released and the  was withdrawn.  The anoscope was then withdrawn.    I still see a segment of hemorrhoids that would benefit from banding at the 9 o'clock position.  I like to see the patient back for at least one more banding procedure.  Follow-up in 2 weeks

## 2021-06-18 NOTE — PROGRESS NOTES
Hemorrhoid Banding Procedure:  Patient was placed in a jackknife position  The anoscope with loop was advanced into the rectum.  The obturator was removed.  The scope was slowly withdrawn and a prominent hemorrhoid grouping was noted through the lumen of the anoscope.  The hemorrhoid  was advanced into the anoscope with suction the prominent hemorrhoid tissue was aspirated into the lumen of the banding apparatus.  2 bands were then fired over the hemorrhoid at the 9:00 position.  The suction was released and the  was withdrawn.  The anoscope was then withdrawn.    I still see a segment of hemorrhoids that would benefit from banding at the 9 o'clock position.     We have completed what needs to be done to treat his internal hemorrhoidal disease for now.  I will not schedule any follow-up banding procedures at this time.  If he has any troubles with symptoms of internal hemorrhoids in the near distant future I will be happy to see Mr. Mata.    [facility]  [phone]

## 2021-06-18 NOTE — PROGRESS NOTES
Assessment:   Joselo Mata Jr. is a 27 y.o. male who is establish patient  to my practice     Diabetes Mellitus type II, under unsatisfactory control.      Joselo was seen today for diabetes.    Diagnoses and all orders for this visit:    Diabetes mellitus (H)  Comments:  patient will be seen at [facility] in [facility] , Dr Jaspreet Wise   Orders:  -     Glycosylated Hemoglobin A1c  -     [facility] RED    Essential hypertension    Hyperlipidemia LDL goal <70    Obesity, morbid, BMI 50 or higher (H)    Need for Tdap vaccination    Other orders  -     Tdap vaccine,  8yo or older,  IM          Plan:        1.  Rx changes: yes started on lantus and once weekly GLP-1  2.  Education: Reviewed  ABCs  of diabetes management (respective goals in parentheses):  A1C (<7), blood pressure (<130/80), and cholesterol (LDL <100).  3.  Compliance at present is estimated to be poor. Efforts to improve compliance (if necessary) will be directed at dietary modifications: low carb, increased exercise and regular blood sugar monitoring: 3x times daily.  4. Follow up: 3 months         Subjective:        Joselo Mata Jr. is an 27 y.o. male who presents for diabetes follow-up. Doing better   Current symptoms/problems include none     Known diabetic complications: none  Cardiovascular risk factors: diabetes mellitus, dyslipidemia, hypertension, male gender, microalbuminuria, obesity (BMI >= 30 kg/m2) and sedentary lifestyle  Current diabetic medications include oral agents (dual therapy): combination: janumet.   Eye exam current (within one year): yes  Weight trend: fluctuating a bit  Prior visit with dietician: yes - checking blood sugars randomly fasting blood sugars <200  Current diet:trying to eat  diabetic diet and did show slight improvement in A1c  Current exercise: none    Current monitoring regimen: none  Home blood sugar records: none   Any episodes of hypoglycemia? no    Is He on ACE inhibitor or  angiotensin II receptor blocker yes       Active Ambulatory Problems     Diagnosis Date Noted     Obesity, morbid, BMI 50 or higher (H) 02/06/2017     Diabetes mellitus (H) 02/13/2017     Hyperlipidemia LDL goal <70 04/18/2018     Essential hypertension 04/18/2018     Resolved Ambulatory Problems     Diagnosis Date Noted     No Resolved Ambulatory Problems     Past Medical History:   Diagnosis Date     Diabetes mellitus (H)      Hypertension      Morbid obesity with BMI of 45.0-49.9, adult (H)        Social History     Social History Narrative       family history includes Basal cell carcinoma in his father; Cancer (age of onset: 80) in his paternal grandmother; Diabetes in his father, maternal aunt, maternal grandmother, maternal uncle, mother, and paternal grandfather; Heart attack in his paternal grandfather; Hyperlipidemia in his mother; Hypothyroidism in his mother; Stroke in his paternal grandmother.    Past Medical History:   Diagnosis Date     Diabetes mellitus (H)      Hypertension      Morbid obesity with BMI of 45.0-49.9, adult (H)        Hemoglobin A1c   Date/Time Value Ref Range Status   06/19/2018 08:55 AM 10.5 (H) 3.5 - 6.1 % Final     Cholesterol   Date/Time Value Ref Range Status   04/17/2018 08:37  (H) <=199 mg/dL Final     HDL Cholesterol   Date/Time Value Ref Range Status   04/17/2018 08:37 AM 40 >=40 mg/dL Final     No components found for: MICROALBCRU, [id number], MICROALBRAND    Current Medications: review by me   Current Outpatient Prescriptions on File Prior to Visit   Medication Sig Dispense Refill     aspirin 81 mg chewable tablet Chew 1 tablet (81 mg total) daily. 30 tablet 11     atorvastatin (LIPITOR) 80 MG tablet Take 1 tablet (80 mg total) by mouth at bedtime. 90 tablet 3     blood glucose test (CONTOUR NEXT TEST STRIPS) strips Use 1 each As Directed 2 (two) times a day. Dispense brand per patient's insurance at pharmacy discretion. 100 strip 3     dulaglutide (TRULICITY) 0.75  "mg/0.5 mL PnIj Inject 0.75 mg under the skin every 7 days. 2 mL 3     generic lancets (FINGERSTIX LANCETS) Dispense brand per patient's insurance at pharmacy discretion. 100 each 4     insulin glargine (LANTUS; BASAGLAR) 100 unit/mL (3 mL) pen Inject 24-36 Units under the skin at bedtime. 5 adj dose pen 2     lisinopril (PRINIVIL,ZESTRIL) 10 MG tablet Take 1 tablet (10 mg total) by mouth daily. 90 tablet 1     MULTIVIT-MINERALS/FOLIC ACID (MEN'S MULTIVITAMIN GUMMIES ORAL) Take by mouth.       pen needle, diabetic (BD ULTRA-FINE REINALDO PEN NEEDLE) 32 gauge x 5/32\" Ndle Use 1 per day. 50 each 4     sitaGLIPtin-metFORMIN (JANUMET)  mg per tablet Take 1 tablet by mouth 2 (two) times a day with meals. 180 tablet 0     [DISCONTINUED] exenatide microspheres (BYDUREON) 2 mg/0.65 mL PnIj extended release injection Inject 0.65 mL (2 mg total) under the skin every 7 days. 4 each 11     [DISCONTINUED] semaglutide (OZEMPIC) 0.25 mg or 0.5 mg(2 mg/1.5 mL) PnIj Inject 0.5 mg under the skin every 7 days. 1.5 mL 3     No current facility-administered medications on file prior to visit.         Allergies:   Penicillins and Amoxicillin     OBJECTIVE:  /71 (Patient Site: Left Arm, Patient Position: Sitting, Cuff Size: Adult Large)  Pulse 91  Wt (!) 344 lb 3.2 oz (156.1 kg)  BMI 48.69 kg/m2  General Appearance: Pleasant, alert, appropriate appearance for age. No acute distress   Eye: Normal red reflex and fundoscopic exam.  Heart: normal; regular rate and rhythm.  S1, S2, no murmur, click, gallop, or rubs.  Lung: Clear to auscultation, no wheezing, crackles or rhonchi.  No increased work of breathing.  Feet: Left and right foot: monofilament sensation normal, good pedal pulses, no lesions, nail hygiene good. Monofilament *  NEUROLOGY: deep tendon reflexes +2   PSYCHIATRY  oriented to time place and person normal mood and affect.        Lab Review  CO2 (mmol/L)   Date Value   05/21/2018 23 04/17/2018 22   02/13/2017 25 "     BUN (mg/dL)   Date Value   05/21/2018 10   04/17/2018 13   02/13/2017 11     Creatinine (mg/dL)   Date Value   05/21/2018 0.83   04/17/2018 1.15   02/13/2017 0.77          Mindy Owens    Patient Instructions     Dear Joselo,    GOAL :of the treatment  Is to control diabetes --  diabetes is a chronic and progressive disease, which, as a result, may  requires insulin at some point; emphasizing that needing insulin is not a failure.   -- Main goal of controlling diabetes is to prevent vascular complications (retinopathy, nephropathy, neuropathy and cardiovascular) or the progression of these.     Diabetes Management Goals     1. Have hemoglobin A1c at less than 8.0%  2. Keep your LDL (bad cholesterol) 99mg/dl or lower  3. Keep your blood pressure at 139/89 or lower  4. Take 1 aspirin daily (81mg) if your doctor advises  5. Don't use tobacco products    An eye exam and foot exam is recommended yearly.    Encouraged  to check feet daily for abnormalities    diabetes mellitus slight improvement keep up with good work.We will increase  long acting insulin lantus at bedtime to  36 units to help meet the goal of fasting blood sugar <100 and 2 hr after eating less the <160 and also once weekly injection   You can easily increase 2 unites of lantus every night if fasting blood sugars still more then 100 ( 38,40,42,44)  Lab Results   Component Value Date    HGBA1C 10.5 (H) 06/19/2018    HGBA1C 13.1 (H) 05/21/2018    HGBA1C 12.9 (H) 04/17/2018     Lab Results   Component Value Date    MICROALBUR 43.19 (H) 04/17/2018    LDLCALC 143 (H) 04/17/2018    CREATININE 0.83 05/21/2018          Medications: Unless changed by a recent  lab result we continue the same medications     Current Outpatient Prescriptions on File Prior to Visit   Medication Sig Dispense Refill     aspirin 81 mg chewable tablet Chew 1 tablet (81 mg total) daily. 30 tablet 11     atorvastatin (LIPITOR) 80 MG tablet Take 1 tablet (80 mg total) by mouth at  "bedtime. 90 tablet 3     blood glucose test (CONTOUR NEXT TEST STRIPS) strips Use 1 each As Directed 2 (two) times a day. Dispense brand per patient's insurance at pharmacy discretion. 100 strip 3     dulaglutide (TRULICITY) 0.75 mg/0.5 mL PnIj Inject 0.75 mg under the skin every 7 days. 2 mL 3     generic lancets (FINGERSTIX LANCETS) Dispense brand per patient's insurance at pharmacy discretion. 100 each 4     insulin glargine (LANTUS; BASAGLAR) 100 unit/mL (3 mL) pen Inject 24-36 Units under the skin at bedtime. 5 adj dose pen 2     lisinopril (PRINIVIL,ZESTRIL) 10 MG tablet Take 1 tablet (10 mg total) by mouth daily. 90 tablet 1     MULTIVIT-MINERALS/FOLIC ACID (MEN'S MULTIVITAMIN GUMMIES ORAL) Take by mouth.       pen needle, diabetic (BD ULTRA-FINE REINALDO PEN NEEDLE) 32 gauge x 5/32\" Ndle Use 1 per day. 50 each 4     sitaGLIPtin-metFORMIN (JANUMET)  mg per tablet Take 1 tablet by mouth 2 (two) times a day with meals. 180 tablet 0     [DISCONTINUED] exenatide microspheres (BYDUREON) 2 mg/0.65 mL PnIj extended release injection Inject 0.65 mL (2 mg total) under the skin every 7 days. 4 each 11     [DISCONTINUED] semaglutide (OZEMPIC) 0.25 mg or 0.5 mg(2 mg/1.5 mL) PnIj Inject 0.5 mg under the skin every 7 days. 1.5 mL 3     No current facility-administered medications on file prior to visit.    . .       -- Continue  cholesterol lowering drug  per guidelines to prevent heart disease to achieve LDL level less the 99    -- Measure blood sugar 1-2 time per day at varying times pre-meal l, 2hrs post meal and at bedtime. Bring log book to next appointment.   GOAL <100 fasting and <140-160 postmeal    1. Reduced intakes of saturated fats and cholesterol.   2. Take ample dietary fiber or supplement.   3. Weight control optimal BMI <25 but even 5% of weight loss from your current weight will help all cardiac risk factors.   4Increased physical activity at least 150 minute/wk where you heart rate>120 and you sweat and " very difficult to talk.  5--please don't skip your medications even when you fasting on the day of the visit.  6- Recommenend ophthalmology exam (frequency set per ophthalmology).   7-- Recommend dental exam every 6 months.     8- Return to care in 3 months for next diabetes check.     Mindy Owens

## 2021-06-18 NOTE — PROGRESS NOTES
Assessment:   Joselo Mata Jr. is a 27 y.o. male who is establish patient  to my practice     Diabetes Mellitus type II, under unsatisfactory control.      Joselo was seen today for diabetes.    Diagnoses and all orders for this visit:    Diabetes mellitus  -     Glycosylated Hemoglobin A1c  -     exenatide microspheres (BYDUREON) 2 mg/0.65 mL PnIj extended release injection; Inject 0.65 mL (2 mg total) under the skin every 7 days.  -     insulin glargine (LANTUS SOLOSTAR U-100 INSULIN) 100 unit/mL (3 mL) pen; Inject 20 Units under the skin daily. Do not mix Lantus with any other insulin    Obesity, morbid, BMI 50 or higher    Essential hypertension  -     Basic Metabolic Panel    Hyperlipidemia LDL goal <70          Plan:        1.  Rx changes: yes started on lantus and once weekly GLP-1  2.  Education: Reviewed  ABCs  of diabetes management (respective goals in parentheses):  A1C (<7), blood pressure (<130/80), and cholesterol (LDL <100).  3.  Compliance at present is estimated to be poor. Efforts to improve compliance (if necessary) will be directed at dietary modifications: low carb, increased exercise and regular blood sugar monitoring: 3x times daily.  4. Follow up: 3 months         Subjective:        Joselo Mata Jr. is an 27 y.o. male who presents for diabetes follow-up.    Current symptoms/problems include none     Known diabetic complications: none  Cardiovascular risk factors: diabetes mellitus, dyslipidemia, hypertension, male gender, microalbuminuria, obesity (BMI >= 30 kg/m2) and sedentary lifestyle  Current diabetic medications include oral agents (dual therapy): combination: janumet.   Eye exam current (within one year): yes  Weight trend: fluctuating a bit  Prior visit with dietician: yes - Follow up tomorrow , hasn't started checking his blood sugars yet   Current diet:trying to eat  diabetic but A1c went up  Current exercise: none    Current monitoring regimen: none  Home  blood sugar records: none   Any episodes of hypoglycemia? no    Is He on ACE inhibitor or angiotensin II receptor blocker yes       Active Ambulatory Problems     Diagnosis Date Noted     Obesity, morbid, BMI 50 or higher 02/06/2017     Diabetes mellitus 02/13/2017     Hyperlipidemia LDL goal <70 04/18/2018     Essential hypertension 04/18/2018     Resolved Ambulatory Problems     Diagnosis Date Noted     No Resolved Ambulatory Problems     Past Medical History:   Diagnosis Date     Diabetes mellitus      Hypertension      Morbid obesity with BMI of 45.0-49.9, adult        Social History     Social History Narrative       family history includes Basal cell carcinoma in his father; Cancer (age of onset: 80) in his paternal grandmother; Diabetes in his father, maternal aunt, maternal grandmother, maternal uncle, mother, and paternal grandfather; Heart attack in his paternal grandfather; Hyperlipidemia in his mother; Hypothyroidism in his mother; Stroke in his paternal grandmother.    Past Medical History:   Diagnosis Date     Diabetes mellitus      Hypertension      Morbid obesity with BMI of 45.0-49.9, adult        Hemoglobin A1c   Date/Time Value Ref Range Status   05/21/2018 08:38 AM 13.1 (H) 3.5 - 6.1 % Final     Cholesterol   Date/Time Value Ref Range Status   04/17/2018 08:37  (H) <=199 mg/dL Final     HDL Cholesterol   Date/Time Value Ref Range Status   04/17/2018 08:37 AM 40 >=40 mg/dL Final     No components found for: MICROALBCRU, [id number], MICROALBRAND    Current Medications: review by me   Current Outpatient Prescriptions on File Prior to Visit   Medication Sig Dispense Refill     aspirin 81 mg chewable tablet Chew 1 tablet (81 mg total) daily. 30 tablet 11     atorvastatin (LIPITOR) 80 MG tablet Take 1 tablet (80 mg total) by mouth at bedtime. 90 tablet 3     lisinopril (PRINIVIL,ZESTRIL) 10 MG tablet Take 1 tablet (10 mg total) by mouth daily. 90 tablet 1     MULTIVIT-MINERALS/FOLIC ACID (MEN'S  MULTIVITAMIN GUMMIES ORAL) Take by mouth.       sitaGLIPtin-metFORMIN (JANUMET)  mg per tablet Take 1 tablet by mouth 2 (two) times a day with meals. 180 tablet 0     blood glucose test (CONTOUR NEXT TEST STRIPS) strips Use 1 each As Directed 2 (two) times a day. Dispense brand per patient's insurance at pharmacy discretion. 100 strip 3     generic lancets (FINGERSTIX LANCETS) Dispense brand per patient's insurance at pharmacy discretion. 100 each 4     No current facility-administered medications on file prior to visit.         Allergies:   Penicillins and Amoxicillin     OBJECTIVE:  /86 (Patient Site: Right Arm, Patient Position: Sitting, Cuff Size: Adult Large)  Pulse 92  Wt (!) 340 lb 1.6 oz (154.3 kg)  BMI 48.11 kg/m2  General Appearance: Pleasant, alert, appropriate appearance for age. No acute distress   Eye: Normal red reflex and fundoscopic exam.  Heart: normal; regular rate and rhythm.  S1, S2, no murmur, click, gallop, or rubs.  Lung: Clear to auscultation, no wheezing, crackles or rhonchi.  No increased work of breathing.  Feet: Left and right foot: monofilament sensation normal, good pedal pulses, no lesions, nail hygiene good. Monofilament *  NEUROLOGY: deep tendon reflexes +2   PSYCHIATRY  oriented to time place and person normal mood and affect.        Lab Review  CO2 (mmol/L)   Date Value   04/17/2018 22   02/13/2017 25     BUN (mg/dL)   Date Value   04/17/2018 13   02/13/2017 11     Creatinine (mg/dL)   Date Value   04/17/2018 1.15   02/13/2017 0.77          Mindy Owens    Patient Instructions     Dear Joselo,    GOAL :of the treatment  Is to control diabetes --  diabetes is a chronic and progressive disease, which, as a result, may  requires insulin at some point; emphasizing that needing insulin is not a failure.   -- Main goal of controlling diabetes is to prevent vascular complications (retinopathy, nephropathy, neuropathy and cardiovascular) or the progression of these.      Diabetes Management Goals     1. Have hemoglobin A1c at less than 8.0%  2. Keep your LDL (bad cholesterol) 99mg/dl or lower  3. Keep your blood pressure at 139/89 or lower  4. Take 1 aspirin daily (81mg) if your doctor advises  5. Don't use tobacco products    An eye exam and foot exam is recommended yearly.    Encouraged  to check feet daily for abnormalities    Your diabetes gotten worse since we started the medication , I believe you are late onset type 1 diabetes. We need you to start the long acting insulin lantus at bedtime 20 units which we will gradually titrate up if needed to meet the goal of fasting blood sugar <100 and 2 hr after eating less the <160 and also once weekly injection ( you can choose every Sunday )  We will have you see one of our pharmacist or diabetic educator to get education on how to start insulin    Lab Results   Component Value Date    HGBA1C 13.1 (H) 05/21/2018    HGBA1C 12.9 (H) 04/17/2018    HGBA1C 6.7 (H) 02/07/2017     Lab Results   Component Value Date    MICROALBUR 43.19 (H) 04/17/2018    LDLCALC 143 (H) 04/17/2018    CREATININE 1.15 04/17/2018          Medications: Unless changed by a recent  lab result we continue the same medications     Current Outpatient Prescriptions on File Prior to Visit   Medication Sig Dispense Refill     aspirin 81 mg chewable tablet Chew 1 tablet (81 mg total) daily. 30 tablet 11     atorvastatin (LIPITOR) 80 MG tablet Take 1 tablet (80 mg total) by mouth at bedtime. 90 tablet 3     lisinopril (PRINIVIL,ZESTRIL) 10 MG tablet Take 1 tablet (10 mg total) by mouth daily. 90 tablet 1     MULTIVIT-MINERALS/FOLIC ACID (MEN'S MULTIVITAMIN GUMMIES ORAL) Take by mouth.       sitaGLIPtin-metFORMIN (JANUMET)  mg per tablet Take 1 tablet by mouth 2 (two) times a day with meals. 180 tablet 0     blood glucose test (CONTOUR NEXT TEST STRIPS) strips Use 1 each As Directed 2 (two) times a day. Dispense brand per patient's insurance at pharmacy  discretion. 100 strip 3     generic lancets (FINGERSTIX LANCETS) Dispense brand per patient's insurance at pharmacy discretion. 100 each 4     No current facility-administered medications on file prior to visit.    . .       -- Continue  cholesterol lowering drug  per guidelines to prevent heart disease to achieve LDL level less the 99    -- Measure blood sugar 1-2 time per day at varying times pre-meal l, 2hrs post meal and at bedtime. Bring log book to next appointment.   GOAL <100 fasting and <140-160 postmeal    1. Reduced intakes of saturated fats and cholesterol.   2. Take ample dietary fiber or supplement.   3. Weight control optimal BMI <25 but even 5% of weight loss from your current weight will help all cardiac risk factors.   4Increased physical activity at least 150 minute/wk where you heart rate>120 and you sweat and very difficult to talk.  5--please don't skip your medications even when you fasting on the day of the visit.  6- Recommenend ophthalmology exam (frequency set per ophthalmology).   7-- Recommend dental exam every 6 months.     8- Return to care in 3 months for next diabetes check.     Mindy Owens

## 2021-06-26 NOTE — PROGRESS NOTES
Progress Notes by Eduardo De La Cruz, Diabetes Ed at 5/22/2018  8:30 AM     Author: Eduardo De La Cruz, Diabetes Ed Service: -- Author Type: Diabetes Ed    Filed: 5/22/2018  1:45 PM Encounter Date: 5/22/2018 Status: Attested    : Eduardo De La Cruz, Diabetes Ed (Diabetes Ed) Cosigner: Mindy Owens MD at 5/22/2018  4:20 PM    Attestation signed by Mindy Owens MD at 5/22/2018  4:20 PM    Agree with plan will Follow up with patient in 4 wk    Mindy Owens MD 5/22/2018 4:20 PM                  Assessment: Joselo is here for a follow up on diabetes management.  He has not checked his blood sugars since our last appointment.  We reviewed how to use his meter and he checked his blood sugar which was at 506 1 hour after eating cereal.   He received his new medications from yesterday and brought them with to learn how to use them.  He received Bydureon & lantus.  He was shown how to use Bydureon which can be cumbersome.  He would prefer to switch to a different once/weekly GLP-1 if he insurance allows such as Trulicity or Ozempic.  He demonstrated proper use and gave himself Bydureon at our appointment.  He was then shown how to use his lantus and gave himself 20 units.  He was instructed to aim for a morning blood sugar goal of 110-150 and to titrate his dose by 2 units every 2-3 days he is outside of that range on average.  This will help bring down his fasting blood sugars before his next visit with Dr. Owens & CHUCK.  Joselo would like to get off of these medications eventually and reverse his diabetes.  I highly encouraged him to reduce any carbohydrates in his diet to make this possible.  He knows how to titrate his insulin dose down in case he has blood sugars <110.  He was encouraged to sub zucchini noodles for pasta and cauliflower rice for rice & potatoes.  We reviewed healthier fruits and suggested he stay away from bananas, grapes, & pineapple.  He will incorporate more one ingredient items such as nuts, avocados, & other  vegetables.    Plan: Alternatives to Bydureon (Ozempic & Trulicity) were sent for co-sign to Dr. Owens that are more patient friendly and easier for Joselo to use.  He will start on 20 units of lantus and aim for a blood sugar of 110-150 each morning.  Every 2-3 days he is outside of that range, he will titrate his dose by 2 units until his morning sugars are consistently 110-150.  Joselo will continue to cut down on processed foods and carbohydrates to help with weight loss.  He will continue to work out and will follow up with Dr. Owens & CHUCK in 4 weeks.      Subjective and Objective:      Joselo Cheneyyesica Murray is referred by Dr. Owens for Diabetes Education.     Lab Results   Component Value Date    HGBA1C 13.1 (H) 05/21/2018         Current diabetes medications:  Bydureon, lantus 20 units, janumet 100-1000mg    Goals     ? Monitoring            5/1/18:   Joselo will check his blood sugars 1-2x/daily before breakfast & before dinner.   5/22/18: Not met - reinforced.        ? Nutrition            5/1/18: Joselo will reduce carbs to 45-60g per meal or less to help reduce blood sugars and lose weight.  5/18/18:  Joselo is reducing carbs but has not met this goal.              Follow up:   Primary care visit      Education:     Monitoring   Meter (per above goals): Assessed, Discussed and Literature provided  Monitoring: Assessed, Discussed and Literature provided  BG goals: Assessed, Discussed and Literature provided    Nutrition Management  Nutrition Management: Assessed, Discussed and Literature provided  Weight: Assessed, Discussed and Literature provided  Portions/Balance: Assessed, Discussed and Literature provided  Carb ID/Count: Assessed, Discussed and Literature provided  Label Reading: Assessed and Discussed  Heart Healthy Fats: Assessed and Discussed  Menu Planning: Assessed and Discussed  Dining Out: Assessed and Discussed  Physical Activity: Assessed and Discussed  Medications: Assessed and  Discussed  Orals: Assessed and Discussed  Injected Medications: Assessed and Discussed   Storage/Exp:Assessed and Discussed   Site Rotation: Assessed and Discussed   Sites Assessed: yes    Diabetes Disease Process: Assessed and Discussed    Acute Complications: Prevent, Detect, Treat:  Hypoglycemia: Assessed and Discussed  Hyperglycemia: Assessed and Discussed  Sick Days: Not addressed  Driving: Not addressed    Chronic Complications  Goal Setting and Problem Solving: Assessed and Discussed  Barriers: Assessed and Discussed  Psychosocial Adjustments: Assessed and Discussed      Time spent with the patient: 60 minutes for diabetes education and counseling.   Previous Education: yes  Visit Type:DSMT  Hours Remaining: DSMT 8 and MNT 3      Eduardo De La Cruz  5/22/2018

## 2021-06-26 NOTE — PROGRESS NOTES
Progress Notes by Eduardo De La Cruz, Diabetes Ed at 5/8/2018  8:00 AM     Author: Eduardo De La Cruz, Diabetes Ed Service: -- Author Type: Diabetes Ed    Filed: 5/10/2018  9:42 AM Encounter Date: 5/8/2018 Status: Attested    : Eduardo De La Cruz, Diabetes Ed (Diabetes Ed) Cosigner: Mindy Owens MD at 5/10/2018  2:46 PM    Attestation signed by Mindy Owens MD at 5/10/2018  2:46 PM    Ok  Mindy Owens MD 5/10/2018 2:46 PM                  Joselo did not show for his follow up today.  We will call to reschedule him soon.

## 2021-06-26 NOTE — PROGRESS NOTES
Progress Notes by Eduardo De La Cruz, Diabetes Ed at 6/19/2018 11:30 AM     Author: Eduardo De La Cruz, Diabetes Ed Service: -- Author Type: Diabetes Ed    Filed: 6/19/2018  5:56 PM Encounter Date: 6/19/2018 Status: Attested    : Eduardo De La Cruz, Diabetes Ed (Diabetes Ed) Cosigner: Mindy Owens MD at 6/20/2018  7:46 AM    Attestation signed by Mindy Owens MD at 6/20/2018  7:46 AM    Agree with plan    Mindy Owens MD 6/20/2018 7:46 AM                  Assessment: Joselo is here for a follow up on diabetes management.  He reports feeling fine on Trulicity with no side effects.  He feels a little bit less hungry while on it.  He has been taking 34 units of lantus for the past few days.  His morning blood sugars range from 280-330.  Based on his weight, blood sugars, and per [facility] protocol, I recommended increasing his dose to 44 units to bring his blood sugars down closer to goal range.  He was given a short term goal range of 100-150mg/dl in the mornings before breakfast.  This will encourage him to check his blood sugars in the morning and efficiently titrate his insulin dose by 2 units every 2-3 days that he is >150mg/dl.  We discussed that if he starts to have blood sugars <100, he will start to reduce blood sugars by 2 units.    He is tolerating Trulicity well & his post-prandial blood sugars may benefit from a dose increase.  It may help to reduce his appetite as well.  His only reported post-prandial blood sugar was 530 1 week ago.    Diet:  Breakfast - cereal or a bagel  Lunch - Chicken sandwich  Dinner - salad at [employer] in [place].    He reports that he is eating more vegetables and the only sweets he eats now are fruits.  He does drink diet aleksey tea and diet soda daily.    Exercise:  Joselo has a dog that he walks daily for 1/2 mile to 2 miles.  He hasn't been working out much for the past month, but did get to the gym twice this last week.      Plan: Per [facility] protocol, I recommend increasing  Joselo's Trulicity to 1.5mg to improve post prandial blood sugars and his lantus to 44 units to improve fasting blood sugars.  He will aim for a short-term goal of 100-150mg/dl before breakfast.  This will be adjusted at his upcoming appointments once he reaches that goal.  He will titrate his insulin dose by 2 units every 2 days he is >150mg/dl in the morning.  Joselo will continue to walk his dog and go to the gym for exercise.  He will reduce sweets and carbohydrates at meals for better blood sugars.  His health insurance is changing, so he states he will not be able to come back to Health system.  He will inform his new doctor of his current diabetes medications and regimen.      Subjective and Objective:      Joselo BALDERAS Adolfo Murray is referred by Dr. Owens for Diabetes Education.     Lab Results   Component Value Date    HGBA1C 10.5 (H) 06/19/2018         Current diabetes medications:  trulicity 0.75mg, lantus 34 units    Goals     ? Monitoring            5/1/18:   Joselo will check his blood sugars 1-2x/daily before breakfast & before dinner.   5/22/18: Not met - reinforced.    6/19/18:  Not met - reinforce.  Joselo is checking 2-4x/week.        ? Nutrition            5/1/18: Joselo will reduce carbs to 45-60g per meal or less to help reduce blood sugars and lose weight.  5/18/18:  Joselo is reducing carbs but has not met this goal.    6/19/18:  Joselo reports meeting this goal except for at breakfast time.              Follow up:   Primary care visit  CDE (certified diabetic educator)      Education:     Monitoring   Meter (per above goals): Assessed and Discussed  Monitoring: Assessed, Discussed and Literature provided  BG goals: Assessed, Discussed and Literature provided    Nutrition Management  Nutrition Management: Assessed and Discussed  Weight: Assessed and Discussed  Portions/Balance: Assessed and Discussed  Carb ID/Count: Assessed and Discussed  Label Reading: Not addressed  Heart Healthy Fats:  Not addressed  Menu Planning: Assessed and Discussed  Dining Out: Assessed and Discussed  Physical Activity: Assessed and Discussed  Medications: Assessed and Discussed  Orals: Assessed and Discussed  Injected Medications: Assessed and Discussed   Storage/Exp:Assessed and Discussed   Site Rotation: Assessed and Discussed   Sites Assessed: no    Diabetes Disease Process: Assessed and Discussed    Acute Complications: Prevent, Detect, Treat:  Hypoglycemia: Assessed and Discussed  Hyperglycemia: Assessed and Discussed  Sick Days: Not addressed  Driving: Not addressed    Chronic Complications  Foot Care:Assessed and Discussed  Skin Care: Not addressed  Eye: Assessed and Discussed  ABC: Assessed and Discussed  Teeth:Assessed and Discussed  Goal Setting and Problem Solving: Assessed and Discussed  Barriers: Assessed and Discussed  Psychosocial Adjustments: Assessed and Discussed      Time spent with the patient: 60 minutes for diabetes education and counseling.   Previous Education: yes  Visit Type:DSMT  Hours Remaining: DSMT 7 and MNT 3      Eduardo De La Cruz  6/19/2018

## 2021-06-27 ENCOUNTER — HEALTH MAINTENANCE LETTER (OUTPATIENT)
Age: 31
End: 2021-06-27

## 2021-07-03 NOTE — ADDENDUM NOTE
Addendum Note by Mindy Owens MD at 4/18/2018  8:17 AM     Author: Mindy Owens MD Service: -- Author Type: Physician    Filed: 4/18/2018  8:17 AM Encounter Date: 4/17/2018 Status: Signed    : Mindy Owens MD (Physician)    Addended by: MINDY OWENS on: 4/18/2018 08:17 AM        Modules accepted: Orders

## 2021-10-17 ENCOUNTER — HEALTH MAINTENANCE LETTER (OUTPATIENT)
Age: 31
End: 2021-10-17

## 2022-02-06 ENCOUNTER — HEALTH MAINTENANCE LETTER (OUTPATIENT)
Age: 32
End: 2022-02-06

## 2022-07-24 ENCOUNTER — HEALTH MAINTENANCE LETTER (OUTPATIENT)
Age: 32
End: 2022-07-24

## 2022-10-02 ENCOUNTER — HEALTH MAINTENANCE LETTER (OUTPATIENT)
Age: 32
End: 2022-10-02

## 2023-05-20 ENCOUNTER — HEALTH MAINTENANCE LETTER (OUTPATIENT)
Age: 33
End: 2023-05-20

## 2023-08-12 ENCOUNTER — HEALTH MAINTENANCE LETTER (OUTPATIENT)
Age: 33
End: 2023-08-12

## 2023-12-30 ENCOUNTER — HEALTH MAINTENANCE LETTER (OUTPATIENT)
Age: 33
End: 2023-12-30